# Patient Record
Sex: FEMALE | Race: OTHER | Employment: UNEMPLOYED | URBAN - METROPOLITAN AREA
[De-identification: names, ages, dates, MRNs, and addresses within clinical notes are randomized per-mention and may not be internally consistent; named-entity substitution may affect disease eponyms.]

---

## 2020-02-06 ENCOUNTER — HOSPITAL ENCOUNTER (OUTPATIENT)
Dept: ULTRASOUND IMAGING | Facility: HOSPITAL | Age: 45
Discharge: HOME | End: 2020-02-06
Attending: INTERNAL MEDICINE

## 2020-02-06 DIAGNOSIS — K73.9 CHRONIC HEPATITIS (HCC): ICD-10-CM

## 2020-02-06 PROCEDURE — 76705 ECHO EXAM OF ABDOMEN: CPT | Performed by: INTERNAL MEDICINE

## 2020-02-06 PROCEDURE — 76981 USE PARENCHYMA: CPT | Performed by: INTERNAL MEDICINE

## 2025-01-09 ENCOUNTER — APPOINTMENT (OUTPATIENT)
Dept: GENERAL RADIOLOGY | Facility: HOSPITAL | Age: 50
End: 2025-01-09
Attending: EMERGENCY MEDICINE
Payer: MEDICAID

## 2025-01-09 ENCOUNTER — APPOINTMENT (OUTPATIENT)
Dept: CT IMAGING | Facility: HOSPITAL | Age: 50
End: 2025-01-09
Attending: EMERGENCY MEDICINE
Payer: MEDICAID

## 2025-01-09 ENCOUNTER — HOSPITAL ENCOUNTER (EMERGENCY)
Facility: HOSPITAL | Age: 50
Discharge: HOME | End: 2025-01-09
Attending: EMERGENCY MEDICINE
Payer: MEDICAID

## 2025-01-09 VITALS
BODY MASS INDEX: 23.04 KG/M2 | SYSTOLIC BLOOD PRESSURE: 139 MMHG | TEMPERATURE: 98 F | OXYGEN SATURATION: 98 % | RESPIRATION RATE: 16 BRPM | HEART RATE: 72 BPM | WEIGHT: 130 LBS | DIASTOLIC BLOOD PRESSURE: 96 MMHG | HEIGHT: 63 IN

## 2025-01-09 DIAGNOSIS — K59.00 CONSTIPATION, UNSPECIFIED CONSTIPATION TYPE: ICD-10-CM

## 2025-01-09 DIAGNOSIS — S09.90XA MINOR HEAD INJURY, INITIAL ENCOUNTER: ICD-10-CM

## 2025-01-09 DIAGNOSIS — S16.1XXA STRAIN OF NECK MUSCLE, INITIAL ENCOUNTER: ICD-10-CM
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
## 2025-01-09 DIAGNOSIS — S39.012A LUMBAR STRAIN, INITIAL ENCOUNTER: ICD-10-CM

## 2025-01-09 PROCEDURE — 99284 EMERGENCY DEPT VISIT MOD MDM: CPT

## 2025-01-09 PROCEDURE — 72110 X-RAY EXAM L-2 SPINE 4/>VWS: CPT | Performed by: EMERGENCY MEDICINE

## 2025-01-09 PROCEDURE — 74018 RADEX ABDOMEN 1 VIEW: CPT | Performed by: EMERGENCY MEDICINE

## 2025-01-09 PROCEDURE — 70450 CT HEAD/BRAIN W/O DYE: CPT | Performed by: EMERGENCY MEDICINE

## 2025-01-09 PROCEDURE — 99285 EMERGENCY DEPT VISIT HI MDM: CPT

## 2025-01-09 PROCEDURE — 72052 X-RAY EXAM NECK SPINE 6/>VWS: CPT | Performed by: EMERGENCY MEDICINE

## 2025-01-09 RX ORDER — IBUPROFEN 600 MG/1
600 TABLET, FILM COATED ORAL ONCE
Status: COMPLETED | OUTPATIENT
Start: 2025-01-09 | End: 2025-01-09

## 2025-01-09 RX ORDER — NAPROXEN 500 MG/1
500 TABLET ORAL 2 TIMES DAILY PRN
Qty: 20 TABLET | Refills: 0 | Status: SHIPPED | OUTPATIENT
Start: 2025-01-09

## 2025-01-09 RX ORDER — POLYETHYLENE GLYCOL 3350 17 G/17G
17 POWDER, FOR SOLUTION ORAL DAILY
Qty: 510 G | Refills: 0 | Status: SHIPPED | OUTPATIENT
Start: 2025-01-09 | End: 2025-02-08

## 2025-01-09 RX ORDER — ESTRADIOL 1 MG/1
1 TABLET ORAL DAILY
COMMUNITY
Start: 2024-03-27

## 2025-01-09 RX ORDER — CYCLOBENZAPRINE HCL 10 MG
10 TABLET ORAL ONCE
Status: COMPLETED | OUTPATIENT
Start: 2025-01-09 | End: 2025-01-09

## 2025-01-09 RX ORDER — CYCLOBENZAPRINE HCL 10 MG
10 TABLET ORAL 3 TIMES DAILY PRN
Qty: 20 TABLET | Refills: 0 | Status: SHIPPED | OUTPATIENT
Start: 2025-01-09 | End: 2025-01-16

## 2025-01-09 RX ORDER — IBUPROFEN 800 MG/1
800 TABLET, FILM COATED ORAL EVERY 6 HOURS PRN
COMMUNITY

## 2025-01-09 NOTE — ED QUICK NOTES
Assumed care of patient. Patient sitting up on side of bed independently. Alert and conversing appropriately with family member. Denies needs at this time and verbalizes understanding of plan of care.

## 2025-01-09 NOTE — DISCHARGE INSTRUCTIONS
Use ice not heat for the next 72 hours on any areas of soreness.  Gentle stretches.  Stay well-hydrated.

## 2025-01-09 NOTE — ED INITIAL ASSESSMENT (HPI)
Pt presents to the ED with c/o slip and fall at work last night,  hit head. Pt reports she  may have passed out  briefly. Pt reports after  fall she  had a nosebleed, but does not  recall hitting her nose. Pt c/o  pain to back of head. Pt  awake and  alert,  skin w/d,resps reg/unlabored. Gait  steady. Speech clear

## 2025-01-09 NOTE — ED PROVIDER NOTES
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  She was at work cleaning and slipped on the floor hit the back of her head.  She may have had transient loss of consciousness but thought she was okay.  She was sent home from work and then this morning started having some pain in her neck and her lower back.  Patient states that the lower back pain may be related to constipation because she is having some bloating sensation in her abdomen she did not fall and hit her abdomen at all.  No injuries to her extremities.  No prodrome to the fall.  No other acute complaints.  She did take some ibuprofen when she went home from work but has not taken anything else today.    Objective:     Past Medical History:    Heroin abuse (HCC)    and off methadone 12/24              History reviewed. No pertinent surgical history.             Social History     Socioeconomic History    Marital status:    Tobacco Use    Smoking status: Never    Smokeless tobacco: Never   Vaping Use    Vaping status: Never Used   Substance and Sexual Activity    Alcohol use: Never    Drug use: Not Currently     Types: IV     Comment: heroin use over 10 years ago, now off methadone     Social Drivers of Health     Financial Resource Strain: Not on File (10/16/2022)    Received from [facility]    Financial Resource Strain     Financial Resource Strain: 0   Food Insecurity: Not on File (9/26/2024)    Received from [facility]    Food Insecurity     Food: 0   Transportation Needs: Not on File (10/16/2022)    Received from [facility]    Transportation Needs     Transportation: 0   Physical Activity: Not on File (10/16/2022)    Received from [facility]    Physical Activity     Physical Activity: 0   Stress:  Not on File (10/16/2022)    Received from [facility]    Stress     Stress: 0   Social Connections: Not on File (9/13/2024)    Received from [facility]    Social Connections     Connectedness: 0    Received from [facility]    Housing Stability                  Physical Exam     ED Triage Vitals [01/09/25 0837]   BP (!) 139/96   Pulse 72   Resp 16   Temp 97.5 °F (36.4 °C)   Temp src Temporal   SpO2 98 %   O2 Device None (Room air)       Current Vitals:   Vital Signs  BP: (!) 139/96  Pulse: 72  Resp: 16  Temp: 97.5 °F (36.4 °C)  Temp src: Temporal    Oxygen Therapy  SpO2: 98 %  O2 Device: None (Room air)        Physical Exam  Vitals and nursing note reviewed. Chaperone present: Patient's daughter-in-law interpreting at patient's request.   Constitutional:       Appearance: Normal appearance. She is well-developed and normal weight.   HENT:      Head: Normocephalic and atraumatic.      Comments: No obvious large hematoma appreciated  Neck:      Comments: Mild diffuse tenderness but no distinct point tenderness  Cardiovascular:      Rate and Rhythm: Normal rate and regular rhythm.      Heart sounds: Normal heart sounds.   Pulmonary:      Effort: Pulmonary effort is normal.      Breath sounds: Normal breath sounds. No stridor. No wheezing.   Abdominal:      General: Bowel sounds are normal. There is distension.      Palpations: Abdomen is soft.      Tenderness: There is no abdominal tenderness. There is no rebound.      Comments: Patient feels mildly distended but there is no rebound there is no guarding   Musculoskeletal:         General: Normal range of motion.      Cervical back: Normal range of motion and neck supple. Tenderness present.      Comments: No pain on palpation of her extremities.  She does have some mild diffuse pain in the lower lumbar region.  No distinct point midline tenderness   Lymphadenopathy:      Cervical: No cervical adenopathy.   Skin:     General: Skin is warm and dry.       Coloration: Skin is not pale.   Neurological:      General: No focal deficit present.      Mental Status: She is alert and oriented to person, place, and time.      Cranial Nerves: No cranial nerve deficit.      Coordination: Coordination normal.      Comments: No foot drop, no focal weakness or numbness            ED Course   Labs Reviewed - No data to display         XR CERVICAL SPINE COMPLETE W/FLEX + EXT (CPT=72052)    Result Date: 1/9/2025  PROCEDURE:  XR CERVICAL SPINE COMPLETE W/FLEX + EXT (CPT=72052)  TECHNIQUE:  AP, lateral, obliques, coned down view, and flexion/extension views of the spine were obtained.  COMPARISON:  None.  INDICATIONS:  fall, pain  PATIENT STATED HISTORY: (As transcribed by Technologist)  Patient offered no additional history at this time.    FINDINGS:    Cervical vertebral alignment demonstrates reversal of normal cervical lordosis..  No acute fractures or osseous lesions are identified.  No prevertebral soft tissue swelling.  Multilevel degenerative changes with uncovertebral and facet hypertrophy.  No subluxation on extension or flexion views.  No significant neural foraminal stenosis.            CONCLUSION:  No acute fractures.  Mild degenerative changes with uncovertebral and facet hypertrophy.   LOCATION:  [facility]   Dictated by (CST): Maggie Parson MD on 1/09/2025 at 11:42 AM     Finalized by (CST): Maggie Parson MD on 1/09/2025 at 11:43 AM       XR LUMBAR SPINE (MIN 4 VIEWS) (CPT=72110)    Result Date: 1/9/2025  PROCEDURE:  XR LUMBAR SPINE (MIN 4 VIEWS) (CPT=72110)  TECHNIQUE:  AP, lateral, oblique, and coned down L5-S1 views were obtained.  COMPARISON:  None.  INDICATIONS:  fall, pain  PATIENT STATED HISTORY: (As transcribed by Technologist)  Patient offered no additional history at this time.    FINDINGS:    Lumbar vertebral alignment is within normal limits..  No acute fractures or osseous lesions are identified.  Degenerative changes with facet hypertrophy greatest at  L5-S1 level.  Moderate amount of fecal material noted within the colon.            CONCLUSION:  No acute fractures.  Degenerative changes greatest at L5-S1 level.   LOCATION:  [facility]   Dictated by (CST): Maggie Parson MD on 1/09/2025 at 11:42 AM     Finalized by (CST): Maggie Parson MD on 1/09/2025 at 11:42 AM       XR ABDOMEN (1 VIEW) (CPT=74018)    Result Date: 1/9/2025  PROCEDURE:  XR ABDOMEN (1 VIEW) (CPT=74018)  INDICATIONS:  fall  COMPARISON:  None.  TECHNIQUE:  Supine AP view was obtained.  PATIENT STATED HISTORY: (As transcribed by Technologist)  Patient offered no additional history at this time.    FINDINGS:  Bowel gas pattern is non-obstructive.  Moderate amount of fecal material noted within the colon.  Calcified phleboliths within the pelvis.  The visualized osseous structures are unremarkable.            CONCLUSION:  No acute findings.   LOCATION:  [facility]   Dictated by (CST): Maggie Parson MD on 1/09/2025 at 11:41 AM     Finalized by (CST): Maggie Parson MD on 1/09/2025 at 11:41 AM       CT BRAIN OR HEAD (CPT=70450)    Result Date: 1/9/2025  PROCEDURE:  CT BRAIN OR HEAD (67253)  COMPARISON:  None.  INDICATIONS:  fall  TECHNIQUE:  Noncontrast CT scanning is performed through the brain. Dose reduction techniques were used. Dose information is transmitted to the ACR (American College of Radiology) NRDR (National Radiology Data Registry) which includes the Dose Index Registry.  PATIENT STATED HISTORY: (As transcribed by Technologist)  Fall    FINDINGS:  VENTRICLES/SULCI:  Ventricles and sulci are normal in size.  INTRACRANIAL:  There are no abnormal extraaxial fluid collections.  There is no midline shift.  There are no intraparenchymal brain abnormalities.  There is nothing specific for acute infarct.  There is no hemorrhage or mass lesion.  SINUSES:           No sign of acute sinusitis.  MASTOIDS:          No sign of acute inflammation. SKULL:             No evidence for fracture or osseous  abnormality. OTHER:             None.            CONCLUSION:  No acute intracranial abnormality.    LOCATION:  [facility]   Dictated by (CST): Moise Putnam MD on 1/09/2025 at 10:41 AM     Finalized by (CST): Moise Putnam MD on 1/09/2025 at 10:43 AM            MDM      Patient was given a dose of ibuprofen and Flexeril.  She had a CT scan of her head that person reviewed not appreciating acute intracranial bleeding or midline shift or reviewed radiology report they felt it was within normal limits as well.  She had x-rays of her cervical spine as well as her lumbar spine there reviewed there is no acute compression fractures and these were essentially normal.  She had x-ray of her abdomen and she does have a moderate amount of stool.  We reviewed care and treatment of constipation.  I discussed with her that she most likely will feel more sore tomorrow and that this is common she is to use ice not heat for the next 72 hours.  She does have a previous history of heroin addiction and with no acute focal neurologic deficit and no significant traumatic injury noted on exam or x-ray do not feel that she would benefit from narcotics and these may potentially even be quite harmful with her past history.  Patient was discharged in good condition        Medical Decision Making      Disposition and Plan     Clinical Impression:  1. Fall, initial encounter    2. Minor head injury, initial encounter    3. Strain of neck muscle, initial encounter    4. Lumbar strain, initial encounter    5. Constipation, unspecified constipation type         Disposition:  Discharge  1/9/2025 11:55 am    Follow-up:  [facility]  [address] [place] [address] Dr Elizalde [address] [place] [id number] [phone]  Call in 1 day  Work related injury follow up          Medications Prescribed:  Discharge Medication List as of 1/9/2025 11:55 AM        START taking these medications    Details   naproxen 500 MG Oral Tab Take 1 tablet (500 mg total)  by mouth 2 (two) times daily as needed., Normal, Disp-20 tablet, R-0      cyclobenzaprine 10 MG Oral Tab Take 1 tablet (10 mg total) by mouth 3 (three) times daily as needed for Muscle spasms., Normal, Disp-20 tablet, R-0      polyethylene glycol, PEG 3350, 17 g Oral Powd Pack Take 17 g by mouth daily., Normal, Disp-510 g, R-0                 Supplementary Documentation:

## (undated) NOTE — LETTER
Date & Time: 1/9/2025, 11:55 AM  Patient: Angeline Bob  Encounter Provider(s):    Bernarda Mota MD       To Whom It May Concern:    Angeline Bob was seen and treated in our department on 1/9/2025. She should not return to work until 01/11/2025 .    If you have any questions or concerns, please do not hesitate to call.        _____________________________  Physician/APC Signature